# Patient Record
Sex: MALE | Race: WHITE | Employment: OTHER | URBAN - METROPOLITAN AREA
[De-identification: names, ages, dates, MRNs, and addresses within clinical notes are randomized per-mention and may not be internally consistent; named-entity substitution may affect disease eponyms.]

---

## 2018-01-23 ENCOUNTER — HOSPITAL ENCOUNTER (OUTPATIENT)
Dept: GENERAL RADIOLOGY | Age: 63
Discharge: HOME | End: 2018-01-23

## 2018-01-23 DIAGNOSIS — R05.9 COUGH: ICD-10-CM

## 2024-07-05 ENCOUNTER — TELEPHONE (OUTPATIENT)
Dept: PULMONOLOGY | Age: 69
End: 2024-07-05

## 2024-07-05 DIAGNOSIS — J44.9 CHRONIC OBSTRUCTIVE PULMONARY DISEASE, UNSPECIFIED COPD TYPE (HCC): Primary | ICD-10-CM

## 2024-07-10 ENCOUNTER — HOSPITAL ENCOUNTER (OUTPATIENT)
Dept: PULMONOLOGY | Age: 69
Discharge: HOME | End: 2024-07-10
Attending: INTERNAL MEDICINE
Payer: MEDICARE

## 2024-07-10 DIAGNOSIS — J44.9 CHRONIC OBSTRUCTIVE PULMONARY DISEASE, UNSPECIFIED COPD TYPE (HCC): ICD-10-CM

## 2024-07-10 LAB
DLCO %PRED: 94 %
DLCO PRED: NORMAL
DLCO/VA %PRED: NORMAL
DLCO/VA PRED: NORMAL
DLCO/VA: NORMAL
DLCO: NORMAL
EXPIRATORY TIME-POST: NORMAL
EXPIRATORY TIME: NORMAL
FEF 25-75 %CHNG: NORMAL
FEF 25-75 POST %PRED: NORMAL
FEF 25-75% %PRED-PRE: NORMAL
FEF 25-75% PRED: NORMAL
FEF 25-75-POST: NORMAL
FEF 25-75-PRE: NORMAL
FEV1 %PRED-POST: 67 %
FEV1 %PRED-PRE: 66 %
FEV1 PRED: NORMAL
FEV1-POST: NORMAL
FEV1-PRE: NORMAL
FEV1/FVC %PRED-POST: NORMAL
FEV1/FVC %PRED-PRE: NORMAL
FEV1/FVC PRED: NORMAL
FEV1/FVC-POST: 44 %
FEV1/FVC-PRE: 49 %
FVC %PRED-POST: 117 L
FVC %PRED-PRE: 102 %
FVC PRED: NORMAL
FVC-POST: 5.11 L
FVC-PRE: 4.45 L
GAW %PRED: NORMAL
GAW PRED: NORMAL
GAW: NORMAL
IC PRE %PRED: NORMAL
IC PRED: NORMAL
IC: NORMAL
MEP: NORMAL
MIP: NORMAL
MVV %PRED-PRE: NORMAL
MVV PRED: NORMAL
MVV-PRE: NORMAL
PEF %PRED-POST: NORMAL
PEF %PRED-PRE: NORMAL
PEF PRED: NORMAL
PEF%CHNG: NORMAL
PEF-POST: NORMAL
PEF-PRE: NORMAL
RAW %PRED: NORMAL
RAW PRED: NORMAL
RAW: NORMAL
RV PRE %PRED: NORMAL
RV PRED: NORMAL
RV: NORMAL
SVC %PRED: NORMAL
SVC PRED: NORMAL
SVC: NORMAL
TLC PRE %PRED: 124 %
TLC PRED: NORMAL
TLC: NORMAL
VA %PRED: 118 %
VA PRED: NORMAL
VA: 7.86 L
VTG %PRED: NORMAL
VTG PRED: NORMAL
VTG: NORMAL

## 2024-07-10 PROCEDURE — 94060 EVALUATION OF WHEEZING: CPT

## 2024-07-10 PROCEDURE — 94729 DIFFUSING CAPACITY: CPT

## 2024-07-10 PROCEDURE — 94726 PLETHYSMOGRAPHY LUNG VOLUMES: CPT | Performed by: INTERNAL MEDICINE

## 2024-07-10 PROCEDURE — 94726 PLETHYSMOGRAPHY LUNG VOLUMES: CPT

## 2024-07-10 PROCEDURE — 94060 EVALUATION OF WHEEZING: CPT | Performed by: INTERNAL MEDICINE

## 2024-07-10 PROCEDURE — 6370000000 HC RX 637 (ALT 250 FOR IP): Performed by: INTERNAL MEDICINE

## 2024-07-10 PROCEDURE — 94664 DEMO&/EVAL PT USE INHALER: CPT

## 2024-07-10 PROCEDURE — 94640 AIRWAY INHALATION TREATMENT: CPT

## 2024-07-10 PROCEDURE — 94729 DIFFUSING CAPACITY: CPT | Performed by: INTERNAL MEDICINE

## 2024-07-10 RX ORDER — ALBUTEROL SULFATE 90 UG/1
4 AEROSOL, METERED RESPIRATORY (INHALATION) ONCE
Status: COMPLETED | OUTPATIENT
Start: 2024-07-10 | End: 2024-07-10

## 2024-07-10 RX ADMIN — ALBUTEROL SULFATE 4 PUFF: 90 AEROSOL, METERED RESPIRATORY (INHALATION) at 08:23

## 2024-07-10 ASSESSMENT — PULMONARY FUNCTION TESTS
FVC_PERCENT_PREDICTED_POST: 117
FEV1/FVC_POST: 44
FVC_PRE: 4.45
FEV1_PERCENT_PREDICTED_PRE: 66
FEV1/FVC_PRE: 49
FEV1_PERCENT_PREDICTED_POST: 67
FVC_PERCENT_PREDICTED_PRE: 102
FVC_POST: 5.11

## 2024-07-10 NOTE — PROCEDURES
spirometry was acceptable and reproducible by ATS standards      Spirometry/Flow volume loop:  There is moderate airflow obstruction with no statistically significant postbronchodilator response    Lung volumes:  There is evidence of air trapping and hyperinflation    Diffusing capacity:   Normal DLCO     Impression:  Moderate COPD with hyperinflation and air trapping    FEV1 %Pred-Post   Date Value Ref Range Status   07/10/2024 67 % Final     FEV1/FVC-Post   Date Value Ref Range Status   07/10/2024 44 % Final     TLC Pre %Pred   Date Value Ref Range Status   07/10/2024 124 % Final     DLCO %Pred   Date Value Ref Range Status   07/10/2024 94 % Final           OBSTRUCTION % Predicted FEV1   MILD >70%   MODERATE 60-69%   MODERATELY-SEVERE 50-59%   SEVERE 35-49%   VERY SEVERE <35%         RESTRICTION % Predicted TLC   MILD 66-80%   MODERATE 54-65%   MODERATELY-SEVERE <54%                 DIFFUSION CAPACITY DLCO % Pred   MILD >60% AND < LLN   MODERATE 40-60%   SEVERE <40%       PFT data will be scanned into the media tab under this encounter. Please see the scanned data for numerical values.     Magdy Sanchez MD  [facility] Pulmonary, Sleep and Critical Care Medicine

## 2024-08-29 ENCOUNTER — OFFICE VISIT (OUTPATIENT)
Age: 69
End: 2024-08-29
Payer: MEDICARE

## 2024-08-29 VITALS
DIASTOLIC BLOOD PRESSURE: 76 MMHG | HEART RATE: 76 BPM | BODY MASS INDEX: 33.6 KG/M2 | OXYGEN SATURATION: 95 % | HEIGHT: 71 IN | WEIGHT: 240 LBS | SYSTOLIC BLOOD PRESSURE: 121 MMHG

## 2024-08-29 DIAGNOSIS — Z87.891 HISTORY OF TOBACCO USE: ICD-10-CM

## 2024-08-29 DIAGNOSIS — R06.09 DOE (DYSPNEA ON EXERTION): Primary | ICD-10-CM

## 2024-08-29 DIAGNOSIS — J45.40 ASTHMA, MODERATE PERSISTENT, POORLY-CONTROLLED: ICD-10-CM

## 2024-08-29 PROCEDURE — G8427 DOCREV CUR MEDS BY ELIG CLIN: HCPCS | Performed by: INTERNAL MEDICINE

## 2024-08-29 PROCEDURE — 1123F ACP DISCUSS/DSCN MKR DOCD: CPT | Performed by: INTERNAL MEDICINE

## 2024-08-29 PROCEDURE — 1036F TOBACCO NON-USER: CPT | Performed by: INTERNAL MEDICINE

## 2024-08-29 PROCEDURE — 99204 OFFICE O/P NEW MOD 45 MIN: CPT | Performed by: INTERNAL MEDICINE

## 2024-08-29 PROCEDURE — 3017F COLORECTAL CA SCREEN DOC REV: CPT | Performed by: INTERNAL MEDICINE

## 2024-08-29 PROCEDURE — G8417 CALC BMI ABV UP PARAM F/U: HCPCS | Performed by: INTERNAL MEDICINE

## 2024-08-29 RX ORDER — MELOXICAM 15 MG/1
15 TABLET ORAL DAILY
COMMUNITY
Start: 2023-01-19

## 2024-08-29 RX ORDER — METFORMIN HCL 500 MG
500 TABLET, EXTENDED RELEASE 24 HR ORAL
COMMUNITY
Start: 2024-08-12

## 2024-08-29 RX ORDER — ALBUTEROL SULFATE 90 UG/1
2 AEROSOL, METERED RESPIRATORY (INHALATION) EVERY 6 HOURS PRN
COMMUNITY
Start: 2023-07-18

## 2024-08-29 RX ORDER — SPIRONOLACTONE 25 MG/1
25 TABLET ORAL DAILY
COMMUNITY
Start: 2024-08-12

## 2024-08-29 RX ORDER — BETAMETHASONE DIPROPIONATE 0.5 MG/G
CREAM TOPICAL 2 TIMES DAILY
COMMUNITY
Start: 2022-11-28

## 2024-08-29 RX ORDER — HYDROCHLOROTHIAZIDE 25 MG/1
25 TABLET ORAL EVERY MORNING
COMMUNITY
Start: 2024-07-22

## 2024-08-29 RX ORDER — ATORVASTATIN CALCIUM 20 MG/1
20 TABLET, FILM COATED ORAL DAILY
COMMUNITY
Start: 2024-07-22

## 2024-08-29 RX ORDER — TADALAFIL 5 MG/1
5 TABLET ORAL DAILY PRN
COMMUNITY
Start: 2024-07-22

## 2024-08-29 RX ORDER — TAMSULOSIN HYDROCHLORIDE 0.4 MG/1
0.4 CAPSULE ORAL DAILY
COMMUNITY
Start: 2024-07-22

## 2024-08-29 RX ORDER — PANTOPRAZOLE SODIUM 40 MG/1
40 TABLET, DELAYED RELEASE ORAL DAILY
COMMUNITY
Start: 2024-07-22

## 2024-08-29 RX ORDER — LOSARTAN POTASSIUM 100 MG/1
100 TABLET ORAL DAILY
COMMUNITY
Start: 2024-07-22

## 2024-08-29 RX ORDER — FENOFIBRATE 160 MG/1
160 TABLET ORAL DAILY
COMMUNITY
Start: 2024-07-22

## 2024-08-29 RX ORDER — FLUTICASONE PROPIONATE AND SALMETEROL 500; 50 UG/1; UG/1
1 POWDER RESPIRATORY (INHALATION) 2 TIMES DAILY
Qty: 180 EACH | Refills: 3 | Status: SHIPPED | OUTPATIENT
Start: 2024-08-29 | End: 2024-08-29 | Stop reason: SDUPTHER

## 2024-08-29 RX ORDER — AMLODIPINE BESYLATE 10 MG/1
10 TABLET ORAL DAILY
COMMUNITY
Start: 2024-07-22

## 2024-08-29 RX ORDER — TRAZODONE HYDROCHLORIDE 100 MG/1
100 TABLET ORAL NIGHTLY
COMMUNITY
Start: 2024-08-12

## 2024-08-29 NOTE — PROGRESS NOTES
[facility] Pulmonary and Critical Care    Outpatient Initial Note    Subjective:   CHIEF COMPLAINT / HPI:     The patient is 69 y.o. male who presents today for a new patient visit for evaluation of gradually worsening dyspnea on exertion.  Prosper has a history of asthma/COPD last seen by Dr. Gong (Pulm) at [facility] in 2021.  At that time he was on Breo but subsequently stopped it due to cost.  He also has a history of KEYUR but did not tolerate CPAP.       Currently he gets dyspneic with a flight of 12 steps to the point that he has to stop at the top and rest for about 15 seconds.  He states walking uphills are hard and that the heat worsens his breathing.  His family states that he breathes having when he talks too much.  He has a daily cough productive of yellow phlegm but no hemoptysis.  His wife states that he wheezes daily but has no chest pain or chest tightness.  He has some mild peripheral edema.  He denies any fevers, chills, night sweats, or anorexia.  He is on Mounjaro and is trying to lose weight.  He does have an albuterol inhaler he uses from time to time when he goes on a walk or bikes and states that it does help some    Last imaging was a CT chest in the [facility] system 2012 that showed some mild emphysema and possible mild fibrosis    Past Medical History:    Past Medical History:   Diagnosis Date    COPD (chronic obstructive pulmonary disease) (HCC)     Diabetes mellitus (HCC)     HTN (hypertension)     Hyperlipemia    KEYUR - Did not tolerate CPAP    Social History:      Retired - worked at construction sites  Started 1971 and smoked up to 2 packs of cigarettes a day for 35 years but quit in 2006    Family History:  CAD  No Hx lung disease    Current Medications:  Current Outpatient Medications on File Prior to Visit   Medication Sig Dispense Refill    albuterol sulfate HFA (PROVENTIL;VENTOLIN;PROAIR) 108 (90 Base) MCG/ACT inhaler Inhale 2 puffs into the lungs every 6 hours

## 2024-08-30 RX ORDER — FLUTICASONE PROPIONATE AND SALMETEROL 500; 50 UG/1; UG/1
1 POWDER RESPIRATORY (INHALATION) 2 TIMES DAILY
Qty: 180 EACH | Refills: 3 | Status: SHIPPED | OUTPATIENT
Start: 2024-08-30

## 2024-09-07 ENCOUNTER — HOSPITAL ENCOUNTER (OUTPATIENT)
Dept: CT IMAGING | Age: 69
Discharge: HOME | End: 2024-09-07
Attending: INTERNAL MEDICINE
Payer: MEDICARE

## 2024-09-07 DIAGNOSIS — R06.09 DOE (DYSPNEA ON EXERTION): ICD-10-CM

## 2024-09-07 PROCEDURE — 71250 CT THORAX DX C-: CPT

## 2024-10-08 ENCOUNTER — OFFICE VISIT (OUTPATIENT)
Dept: PULMONOLOGY | Age: 69
End: 2024-10-08
Payer: MEDICARE

## 2024-10-08 VITALS
DIASTOLIC BLOOD PRESSURE: 60 MMHG | SYSTOLIC BLOOD PRESSURE: 118 MMHG | OXYGEN SATURATION: 96 % | HEIGHT: 71 IN | RESPIRATION RATE: 16 BRPM | BODY MASS INDEX: 35.14 KG/M2 | HEART RATE: 82 BPM | WEIGHT: 251 LBS

## 2024-10-08 DIAGNOSIS — Z87.891 HISTORY OF TOBACCO USE: ICD-10-CM

## 2024-10-08 DIAGNOSIS — J44.89 ASTHMA-COPD OVERLAP SYNDROME (HCC): Primary | ICD-10-CM

## 2024-10-08 DIAGNOSIS — R06.09 DOE (DYSPNEA ON EXERTION): ICD-10-CM

## 2024-10-08 PROCEDURE — 3017F COLORECTAL CA SCREEN DOC REV: CPT | Performed by: INTERNAL MEDICINE

## 2024-10-08 PROCEDURE — G8417 CALC BMI ABV UP PARAM F/U: HCPCS | Performed by: INTERNAL MEDICINE

## 2024-10-08 PROCEDURE — G8427 DOCREV CUR MEDS BY ELIG CLIN: HCPCS | Performed by: INTERNAL MEDICINE

## 2024-10-08 PROCEDURE — 1036F TOBACCO NON-USER: CPT | Performed by: INTERNAL MEDICINE

## 2024-10-08 PROCEDURE — 1123F ACP DISCUSS/DSCN MKR DOCD: CPT | Performed by: INTERNAL MEDICINE

## 2024-10-08 PROCEDURE — 99214 OFFICE O/P EST MOD 30 MIN: CPT | Performed by: INTERNAL MEDICINE

## 2024-10-08 PROCEDURE — G8484 FLU IMMUNIZE NO ADMIN: HCPCS | Performed by: INTERNAL MEDICINE

## 2024-10-08 PROCEDURE — 3023F SPIROM DOC REV: CPT | Performed by: INTERNAL MEDICINE

## 2024-10-08 NOTE — PROGRESS NOTES
[facility] Pulmonary and Critical Care    Outpatient Follow Up Note    Subjective:   CHIEF COMPLAINT / HPI:     The patient is 69 y.o. male who is here for follow-up of COPD/asthma overlap syndrome.  Last visit I ordered a high-resolution CT chest on inspiratory and expiratory phase.  It showed moderate to severe emphysema with lower lobe bronchial wall thickening but no tracheobronchial malacia, ILD, masses, or pulmonary nodules.    I started him on Wixela and he has had about a 10 to 20% improvement in dyspnea on exertion.  He continues to have a frequent cough    8/29/2024  Prosper presents today for a new patient visit for evaluation of gradually worsening dyspnea on exertion.  Prosper has a history of asthma/COPD last seen by Dr. Gong (Pulm) at [facility] in 2021.  At that time he was on Breo but subsequently stopped it due to cost.  He also has a history of KEYUR but did not tolerate CPAP.       Currently he gets dyspneic with a flight of 12 steps to the point that he has to stop at the top and rest for about 15 seconds.  He states walking uphills are hard and that the heat worsens his breathing.  His family states that he breathes heavy when he talks too much.  He has a daily cough productive of yellow phlegm but no hemoptysis.  His wife states that he wheezes daily but has no chest pain or chest tightness.  He has some mild peripheral edema.  He denies any fevers, chills, night sweats, or anorexia.  He is on Mounjaro and is trying to lose weight.  He does have an albuterol inhaler he uses from time to time when he goes on a walk or bikes and states that it does help some    Last imaging was a CT chest in the [facility] system 2012 that showed some mild emphysema and possible mild fibrosis    Past Medical History:    Past Medical History:   Diagnosis Date    COPD (chronic obstructive pulmonary disease) (HCC)     Diabetes mellitus (HCC)     HTN (hypertension)     Hyperlipemia    KEYUR - Did not tolerate

## 2025-03-03 ENCOUNTER — OFFICE VISIT (OUTPATIENT)
Dept: PULMONOLOGY | Age: 70
End: 2025-03-03
Payer: MEDICARE

## 2025-03-03 VITALS
DIASTOLIC BLOOD PRESSURE: 64 MMHG | SYSTOLIC BLOOD PRESSURE: 112 MMHG | BODY MASS INDEX: 33.99 KG/M2 | HEART RATE: 78 BPM | OXYGEN SATURATION: 95 % | WEIGHT: 242.8 LBS | HEIGHT: 71 IN

## 2025-03-03 DIAGNOSIS — Z87.891 HISTORY OF TOBACCO USE: ICD-10-CM

## 2025-03-03 DIAGNOSIS — J44.89 ASTHMA-COPD OVERLAP SYNDROME (HCC): Primary | ICD-10-CM

## 2025-03-03 DIAGNOSIS — R06.09 DOE (DYSPNEA ON EXERTION): ICD-10-CM

## 2025-03-03 PROCEDURE — G8417 CALC BMI ABV UP PARAM F/U: HCPCS | Performed by: INTERNAL MEDICINE

## 2025-03-03 PROCEDURE — G2211 COMPLEX E/M VISIT ADD ON: HCPCS | Performed by: INTERNAL MEDICINE

## 2025-03-03 PROCEDURE — 3023F SPIROM DOC REV: CPT | Performed by: INTERNAL MEDICINE

## 2025-03-03 PROCEDURE — 99214 OFFICE O/P EST MOD 30 MIN: CPT | Performed by: INTERNAL MEDICINE

## 2025-03-03 PROCEDURE — G8428 CUR MEDS NOT DOCUMENT: HCPCS | Performed by: INTERNAL MEDICINE

## 2025-03-03 PROCEDURE — 1036F TOBACCO NON-USER: CPT | Performed by: INTERNAL MEDICINE

## 2025-03-03 PROCEDURE — 3017F COLORECTAL CA SCREEN DOC REV: CPT | Performed by: INTERNAL MEDICINE

## 2025-03-03 PROCEDURE — 1123F ACP DISCUSS/DSCN MKR DOCD: CPT | Performed by: INTERNAL MEDICINE

## 2025-03-03 RX ORDER — PREDNISONE 20 MG/1
40 TABLET ORAL DAILY
Qty: 14 TABLET | Refills: 0 | Status: SHIPPED | OUTPATIENT
Start: 2025-03-03

## 2025-03-03 RX ORDER — MONTELUKAST SODIUM 10 MG/1
10 TABLET ORAL DAILY
Qty: 90 TABLET | Refills: 3 | Status: SHIPPED | OUTPATIENT
Start: 2025-03-03

## 2025-03-03 RX ORDER — FLUTICASONE FUROATE, UMECLIDINIUM BROMIDE AND VILANTEROL TRIFENATATE 200; 62.5; 25 UG/1; UG/1; UG/1
1 POWDER RESPIRATORY (INHALATION) DAILY
Qty: 3 EACH | Refills: 3 | Status: SHIPPED | OUTPATIENT
Start: 2025-03-03

## 2025-03-03 NOTE — PROGRESS NOTES
ATS standards        Spirometry/Flow volume loop:  There is moderate airflow obstruction with no statistically significant postbronchodilator response     Lung volumes:  There is evidence of air trapping and hyperinflation     Diffusing capacity:   Normal DLCO      Impression:  Moderate COPD with hyperinflation and air trapping           FEV1 %Pred-Post   Date Value Ref Range Status   07/10/2024 67 % Final            FEV1/FVC-Post   Date Value Ref Range Status   07/10/2024 44 % Final            TLC Pre %Pred   Date Value Ref Range Status   07/10/2024 124 % Final            DLCO %Pred   Date Value Ref Range Status   07/10/2024 94 % Final       Cardiology    ECHO  None on file    Stress Test  None on file    Assessment:      Diagnosis Orders   1. Asthma-COPD overlap syndrome (HCC)        2. MYLES (dyspnea on exertion)        3. History of tobacco use          Suspect this is still poorly controlled asthma    Plan:     Continue Trelegy 200 1 puff daily  Continue prn albuterol  Start Singulair 10 mg daily  Prednisone 40 mg daily  Prosper to [employer] message or call in 2 weeks to notify how his MYLES respond to prednisone  PA Dupixent as EOS at [facility]  Quit smoking in 2003 with 48 pack yr Hx  Up to date with covid, flu, pneumpcpccal and RSV vaccinations  Follow-up with me in 1 months for reevaluation

## 2025-03-04 ENCOUNTER — TELEPHONE (OUTPATIENT)
Dept: PULMONOLOGY | Age: 70
End: 2025-03-04

## 2025-03-04 NOTE — TELEPHONE ENCOUNTER
Orders for Dupixent faxed to [facility] for prior auth & Dupixent [facility] for benefits summary.

## 2025-03-12 ENCOUNTER — TELEPHONE (OUTPATIENT)
Dept: PULMONOLOGY | Age: 70
End: 2025-03-12

## 2025-03-13 RX ORDER — PREDNISONE 5 MG/1
5 TABLET ORAL DAILY
Qty: 30 TABLET | Refills: 2 | Status: SHIPPED | OUTPATIENT
Start: 2025-03-13 | End: 2026-03-13

## 2025-03-28 ENCOUNTER — TELEPHONE (OUTPATIENT)
Dept: PULMONOLOGY | Age: 70
End: 2025-03-28

## 2025-03-28 NOTE — TELEPHONE ENCOUNTER
[facility] called   They are unable to reach patient to schedule a delivery for Dupixent.

## 2025-04-10 ENCOUNTER — OFFICE VISIT (OUTPATIENT)
Dept: PULMONOLOGY | Age: 70
End: 2025-04-10
Payer: MEDICARE

## 2025-04-10 VITALS
BODY MASS INDEX: 34.02 KG/M2 | HEIGHT: 71 IN | RESPIRATION RATE: 16 BRPM | DIASTOLIC BLOOD PRESSURE: 72 MMHG | SYSTOLIC BLOOD PRESSURE: 122 MMHG | HEART RATE: 80 BPM | WEIGHT: 243 LBS | OXYGEN SATURATION: 92 %

## 2025-04-10 DIAGNOSIS — Z87.891 HISTORY OF TOBACCO USE: ICD-10-CM

## 2025-04-10 DIAGNOSIS — J44.89 ASTHMA-COPD OVERLAP SYNDROME (HCC): Primary | ICD-10-CM

## 2025-04-10 DIAGNOSIS — R06.09 DOE (DYSPNEA ON EXERTION): ICD-10-CM

## 2025-04-10 PROCEDURE — G8417 CALC BMI ABV UP PARAM F/U: HCPCS | Performed by: INTERNAL MEDICINE

## 2025-04-10 PROCEDURE — 1159F MED LIST DOCD IN RCRD: CPT | Performed by: INTERNAL MEDICINE

## 2025-04-10 PROCEDURE — 99214 OFFICE O/P EST MOD 30 MIN: CPT | Performed by: INTERNAL MEDICINE

## 2025-04-10 PROCEDURE — 1123F ACP DISCUSS/DSCN MKR DOCD: CPT | Performed by: INTERNAL MEDICINE

## 2025-04-10 PROCEDURE — 3017F COLORECTAL CA SCREEN DOC REV: CPT | Performed by: INTERNAL MEDICINE

## 2025-04-10 PROCEDURE — 1036F TOBACCO NON-USER: CPT | Performed by: INTERNAL MEDICINE

## 2025-04-10 PROCEDURE — G2211 COMPLEX E/M VISIT ADD ON: HCPCS | Performed by: INTERNAL MEDICINE

## 2025-04-10 PROCEDURE — G8427 DOCREV CUR MEDS BY ELIG CLIN: HCPCS | Performed by: INTERNAL MEDICINE

## 2025-04-10 PROCEDURE — 3023F SPIROM DOC REV: CPT | Performed by: INTERNAL MEDICINE

## 2025-04-10 NOTE — PROGRESS NOTES
[facility] Pulmonary and Critical Care    Outpatient Follow Up Note    Subjective:   CHIEF COMPLAINT / HPI:     The patient is 70 y.o. male who is here for follow-up of severe persistent COPD/asthma overlap syndrome.  He is doing some better in regards to his dyspnea on exertion with chronic prednisone at 10 mg daily he still has a daily cough in the morning with clear phlegm and occasional wheezing.  He is using Trelegy 200 and Singulair.  Last visit I ordered Dupixent but there is some issue with pharmacy delivering it.    Cough in Am with phlegm - clear  Occ wheezing  MYLES is some better    3/3/2025  Imer is here for 3 month follow up of COPD/asthma overlap syndrome. Last visit I changed Wixela 500 to Trelegy 200. With that his MYLES improved a little bit but still has considerable MYLES in everyday activities. He is able to walk 5000 steps but does get dyspneic. No cough, wheezing or chest tightness. He does use albuterol and finds it does help some but breathing is still not as good as he would like. He is going to [place] in his RV for two months starting in mid May    10/8/2024  Prosper is here for follow-up of COPD/asthma overlap syndrome.  Last visit I ordered a high-resolution CT chest on inspiratory and expiratory phase.  It showed moderate to severe emphysema with lower lobe bronchial wall thickening but no tracheobroncheomalacia, ILD, masses, or pulmonary nodules.    I started him on Wixela and he has had about a 10 to 20% improvement in dyspnea on exertion.  He continues to have a frequent cough    8/29/2024  Prosper presents today for a new patient visit for evaluation of gradually worsening dyspnea on exertion.  Prosper has a history of asthma/COPD last seen by Dr. Gong (Pulm) at [facility] in 2021.  At that time he was on Breo but subsequently stopped it due to cost.  He also has a history of KEYUR but did not tolerate CPAP.       Currently he gets dyspneic with a flight of 12 steps to the point

## 2025-07-21 ENCOUNTER — OFFICE VISIT (OUTPATIENT)
Dept: PULMONOLOGY | Age: 70
End: 2025-07-21
Payer: MEDICARE

## 2025-07-21 VITALS
HEIGHT: 71 IN | WEIGHT: 250 LBS | HEART RATE: 72 BPM | SYSTOLIC BLOOD PRESSURE: 118 MMHG | BODY MASS INDEX: 35 KG/M2 | DIASTOLIC BLOOD PRESSURE: 68 MMHG | OXYGEN SATURATION: 94 %

## 2025-07-21 DIAGNOSIS — J44.89 ASTHMA-COPD OVERLAP SYNDROME (HCC): Primary | ICD-10-CM

## 2025-07-21 DIAGNOSIS — J45.50 POORLY CONTROLLED SEVERE PERSISTENT ASTHMA WITHOUT COMPLICATION (HCC): ICD-10-CM

## 2025-07-21 PROCEDURE — G8417 CALC BMI ABV UP PARAM F/U: HCPCS | Performed by: INTERNAL MEDICINE

## 2025-07-21 PROCEDURE — 3017F COLORECTAL CA SCREEN DOC REV: CPT | Performed by: INTERNAL MEDICINE

## 2025-07-21 PROCEDURE — 1123F ACP DISCUSS/DSCN MKR DOCD: CPT | Performed by: INTERNAL MEDICINE

## 2025-07-21 PROCEDURE — G2211 COMPLEX E/M VISIT ADD ON: HCPCS | Performed by: INTERNAL MEDICINE

## 2025-07-21 PROCEDURE — 1036F TOBACCO NON-USER: CPT | Performed by: INTERNAL MEDICINE

## 2025-07-21 PROCEDURE — 3023F SPIROM DOC REV: CPT | Performed by: INTERNAL MEDICINE

## 2025-07-21 PROCEDURE — 99214 OFFICE O/P EST MOD 30 MIN: CPT | Performed by: INTERNAL MEDICINE

## 2025-07-21 PROCEDURE — G8428 CUR MEDS NOT DOCUMENT: HCPCS | Performed by: INTERNAL MEDICINE

## 2025-07-21 ASSESSMENT — ASTHMA QUESTIONNAIRES
QUESTION_2 LAST FOUR WEEKS HOW OFTEN HAVE YOU HAD SHORTNESS OF BREATH: MORE THAN ONCE A DAY
QUESTION_4 LAST FOUR WEEKS HOW OFTEN HAVE YOU USED YOUR RESCUE INHALER OR NEBULIZER MEDICATION (SUCH AS ALBUTEROL): NOT AT ALL
ACT_TOTALSCORE: 12
QUESTION_1 LAST FOUR WEEKS HOW MUCH OF THE TIME DID YOUR ASTHMA KEEP YOU FROM GETTING AS MUCH DONE AT WORK, SCHOOL OR AT HOME: ALL OF THE TIME
QUESTION_5 LAST FOUR WEEKS HOW WOULD YOU RATE YOUR ASTHMA CONTROL: POORLY CONTROLED
QUESTION_3 LAST FOUR WEEKS HOW OFTEN DID YOUR ASTHMA SYMPTOMS (WHEEZING, COUGHING, SHORTNESS OF BREATH, CHEST TIGHTNESS OR PAIN) WAKE YOU UP AT NIGHT OR EARLIER THAN USUAL IN THE MORNING: ONCE A WEEK

## 2025-07-21 NOTE — PATIENT INSTRUCTIONS
Resume Trelegy 200 mcg MDI 1 puff every morning  Resume Singulair 10 mg tablet nightly  Use albuterol MDI as needed  Continue guaifenesin  Continue Dupixent  Stay off prednisone

## 2025-07-21 NOTE — PROGRESS NOTES
[facility] Pulmonary and Critical Care    Outpatient Follow Up Note    Subjective:   CHIEF COMPLAINT / HPI:     The patient is 70 y.o. male who is here for routine follow-up of severe persistent COPD/asthma overlap syndrome.  After last visit he did obtain Dupixent and has been using it every 2 weeks.  He also stopped taking prednisone, Trelegy, and Singulair.  He did start taking guaifenesin at the recommendation of his PCP.  He thinks that his cough and clear sputum production is somewhat better but still is plagued by daily dyspnea with routine exertion, and wheezing.  He indicates the summer has been a bit challenging with the hot humid weather.  He did go to [place] in his RV for 2 months starting in May but was limited by his breathing.  He states it was one of the hotter months in their history.    4/10/2025  Imer is here for follow-up of severe persistent COPD/asthma overlap syndrome.  He is doing some better in regards to his dyspnea on exertion with chronic prednisone at 10 mg daily and he still has a daily cough in the morning with clear phlegm and occasional wheezing.  He is using Trelegy 200 and Singulair.  Last visit I ordered Dupixent but there is some issue with pharmacy delivering it.    3/3/2025  Imer is here for 3 month follow up of COPD/asthma overlap syndrome. Last visit I changed Wixela 500 to Trelegy 200. With that his MYLES improved a little bit but still has considerable MYLES in everyday activities. He is able to walk 5000 steps but does get dyspneic. No cough, wheezing or chest tightness. He does use albuterol and finds it does help some but breathing is still not as good as he would like. He is going to [place] in his RV for two months starting in mid May    10/8/2024  Prosper is here for follow-up of COPD/asthma overlap syndrome.  Last visit I ordered a high-resolution CT chest on inspiratory and expiratory phase.  It showed moderate to severe emphysema with lower lobe bronchial wall